# Patient Record
Sex: FEMALE | Race: WHITE | NOT HISPANIC OR LATINO | Employment: OTHER | URBAN - METROPOLITAN AREA
[De-identification: names, ages, dates, MRNs, and addresses within clinical notes are randomized per-mention and may not be internally consistent; named-entity substitution may affect disease eponyms.]

---

## 2021-05-17 ENCOUNTER — TELEPHONE (OUTPATIENT)
Dept: TRANSPLANT | Facility: CLINIC | Age: 61
End: 2021-05-17

## 2021-05-18 ENCOUNTER — TELEPHONE (OUTPATIENT)
Dept: TRANSPLANT | Facility: CLINIC | Age: 61
End: 2021-05-18

## 2021-06-22 ENCOUNTER — TELEPHONE (OUTPATIENT)
Dept: TRANSPLANT | Facility: CLINIC | Age: 61
End: 2021-06-22

## 2021-07-13 ENCOUNTER — TELEPHONE (OUTPATIENT)
Dept: TRANSPLANT | Facility: CLINIC | Age: 61
End: 2021-07-13

## 2021-07-15 DIAGNOSIS — K74.60 HEPATIC CIRRHOSIS, UNSPECIFIED HEPATIC CIRRHOSIS TYPE, UNSPECIFIED WHETHER ASCITES PRESENT: ICD-10-CM

## 2021-07-15 DIAGNOSIS — Z76.82 ORGAN TRANSPLANT CANDIDATE: ICD-10-CM

## 2021-07-29 DIAGNOSIS — Z76.82 ORGAN TRANSPLANT CANDIDATE: ICD-10-CM

## 2021-07-29 DIAGNOSIS — K74.60 HEPATIC CIRRHOSIS, UNSPECIFIED HEPATIC CIRRHOSIS TYPE, UNSPECIFIED WHETHER ASCITES PRESENT: Primary | ICD-10-CM

## 2021-07-29 RX ORDER — FUROSEMIDE 40 MG/1
40 TABLET ORAL DAILY
COMMUNITY
Start: 2021-03-08

## 2021-07-29 RX ORDER — DULAGLUTIDE 1.5 MG/.5ML
INJECTION, SOLUTION SUBCUTANEOUS
COMMUNITY
Start: 2021-06-03

## 2021-07-29 RX ORDER — NADOLOL 20 MG/1
TABLET ORAL
COMMUNITY

## 2021-07-29 RX ORDER — PRAVASTATIN SODIUM 40 MG/1
TABLET ORAL
COMMUNITY
Start: 2021-06-01

## 2021-07-29 RX ORDER — PREGABALIN 150 MG/1
150 CAPSULE ORAL 3 TIMES DAILY
COMMUNITY
Start: 2021-06-26

## 2021-07-29 RX ORDER — LOPERAMIDE HCL 2 MG
TABLET ORAL
COMMUNITY
Start: 2020-07-07

## 2021-07-29 RX ORDER — AMLODIPINE BESYLATE 5 MG/1
5 TABLET ORAL 2 TIMES DAILY
COMMUNITY
Start: 2021-06-16

## 2021-07-29 RX ORDER — VENLAFAXINE HYDROCHLORIDE 150 MG/1
150 CAPSULE, EXTENDED RELEASE ORAL EVERY MORNING
COMMUNITY
Start: 2021-06-26

## 2021-07-29 RX ORDER — FLUTICASONE PROPIONATE 50 MCG
2 SPRAY, SUSPENSION (ML) NASAL DAILY
COMMUNITY
Start: 2021-05-07

## 2021-07-29 RX ORDER — INSULIN GLARGINE 300 U/ML
INJECTION, SOLUTION SUBCUTANEOUS
COMMUNITY

## 2021-07-29 RX ORDER — LEVOTHYROXINE SODIUM 75 UG/1
75 TABLET ORAL EVERY MORNING
COMMUNITY
Start: 2021-03-07

## 2021-07-29 RX ORDER — POTASSIUM CHLORIDE 1500 MG/1
20 TABLET, EXTENDED RELEASE ORAL DAILY
COMMUNITY
Start: 2021-06-26

## 2021-07-29 RX ORDER — ONDANSETRON 4 MG/1
TABLET, ORALLY DISINTEGRATING ORAL
COMMUNITY

## 2021-07-29 RX ORDER — LISINOPRIL AND HYDROCHLOROTHIAZIDE 20; 25 MG/1; MG/1
1 TABLET ORAL EVERY MORNING
COMMUNITY
Start: 2021-06-18

## 2021-07-29 RX ORDER — METFORMIN HYDROCHLORIDE 1000 MG/1
1000 TABLET ORAL 2 TIMES DAILY
COMMUNITY
Start: 2021-03-08

## 2021-07-29 RX ORDER — PANTOPRAZOLE SODIUM 40 MG/1
TABLET, DELAYED RELEASE ORAL
COMMUNITY

## 2021-07-29 RX ORDER — FLUTICASONE PROPIONATE AND SALMETEROL 500; 50 UG/1; UG/1
POWDER RESPIRATORY (INHALATION)
COMMUNITY
Start: 2021-06-05

## 2021-08-06 ENCOUNTER — TELEPHONE (OUTPATIENT)
Dept: TRANSPLANT | Facility: CLINIC | Age: 61
End: 2021-08-06

## 2021-08-13 ENCOUNTER — HOSPITAL ENCOUNTER (OUTPATIENT)
Dept: RADIOLOGY | Facility: HOSPITAL | Age: 61
Discharge: HOME | End: 2021-08-13
Attending: STUDENT IN AN ORGANIZED HEALTH CARE EDUCATION/TRAINING PROGRAM
Payer: MEDICARE

## 2021-08-13 ENCOUNTER — OFFICE VISIT (OUTPATIENT)
Dept: TRANSPLANT | Facility: CLINIC | Age: 61
End: 2021-08-13
Payer: COMMERCIAL

## 2021-08-13 VITALS
BODY MASS INDEX: 35.38 KG/M2 | TEMPERATURE: 98 F | OXYGEN SATURATION: 94 % | HEIGHT: 64 IN | HEART RATE: 70 BPM | DIASTOLIC BLOOD PRESSURE: 64 MMHG | RESPIRATION RATE: 16 BRPM | SYSTOLIC BLOOD PRESSURE: 141 MMHG | WEIGHT: 207.25 LBS

## 2021-08-13 DIAGNOSIS — K74.60 HEPATIC CIRRHOSIS, UNSPECIFIED HEPATIC CIRRHOSIS TYPE, UNSPECIFIED WHETHER ASCITES PRESENT: ICD-10-CM

## 2021-08-13 DIAGNOSIS — Z76.82 ORGAN TRANSPLANT CANDIDATE: ICD-10-CM

## 2021-08-13 DIAGNOSIS — B19.20 COMPENSATED CIRRHOSIS RELATED TO HEPATITIS C VIRUS (HCV): Primary | ICD-10-CM

## 2021-08-13 DIAGNOSIS — K74.69 COMPENSATED CIRRHOSIS RELATED TO HEPATITIS C VIRUS (HCV): Primary | ICD-10-CM

## 2021-08-13 PROCEDURE — 3008F BODY MASS INDEX DOCD: CPT | Mod: CPTII,S$GLB,TXP, | Performed by: STUDENT IN AN ORGANIZED HEALTH CARE EDUCATION/TRAINING PROGRAM

## 2021-08-13 PROCEDURE — 81001 URINALYSIS AUTO W/SCOPE: CPT | Mod: 91,NTX | Performed by: STUDENT IN AN ORGANIZED HEALTH CARE EDUCATION/TRAINING PROGRAM

## 2021-08-13 PROCEDURE — 99999 PR PBB SHADOW E&M-EST. PATIENT-LVL V: ICD-10-PCS | Mod: PBBFAC,TXP,, | Performed by: STUDENT IN AN ORGANIZED HEALTH CARE EDUCATION/TRAINING PROGRAM

## 2021-08-13 PROCEDURE — 93975 VASCULAR STUDY: CPT | Mod: TC,TXP

## 2021-08-13 PROCEDURE — 80307 DRUG TEST PRSMV CHEM ANLYZR: CPT | Mod: NTX | Performed by: STUDENT IN AN ORGANIZED HEALTH CARE EDUCATION/TRAINING PROGRAM

## 2021-08-13 PROCEDURE — 3077F SYST BP >= 140 MM HG: CPT | Mod: CPTII,S$GLB,TXP, | Performed by: STUDENT IN AN ORGANIZED HEALTH CARE EDUCATION/TRAINING PROGRAM

## 2021-08-13 PROCEDURE — 3008F PR BODY MASS INDEX (BMI) DOCUMENTED: ICD-10-PCS | Mod: CPTII,S$GLB,TXP, | Performed by: STUDENT IN AN ORGANIZED HEALTH CARE EDUCATION/TRAINING PROGRAM

## 2021-08-13 PROCEDURE — 1160F PR REVIEW ALL MEDS BY PRESCRIBER/CLIN PHARMACIST DOCUMENTED: ICD-10-PCS | Mod: CPTII,S$GLB,TXP, | Performed by: STUDENT IN AN ORGANIZED HEALTH CARE EDUCATION/TRAINING PROGRAM

## 2021-08-13 PROCEDURE — 3078F PR MOST RECENT DIASTOLIC BLOOD PRESSURE < 80 MM HG: ICD-10-PCS | Mod: CPTII,S$GLB,TXP, | Performed by: STUDENT IN AN ORGANIZED HEALTH CARE EDUCATION/TRAINING PROGRAM

## 2021-08-13 PROCEDURE — 3078F DIAST BP <80 MM HG: CPT | Mod: CPTII,S$GLB,TXP, | Performed by: STUDENT IN AN ORGANIZED HEALTH CARE EDUCATION/TRAINING PROGRAM

## 2021-08-13 PROCEDURE — 99205 PR OFFICE/OUTPT VISIT, NEW, LEVL V, 60-74 MIN: ICD-10-PCS | Mod: S$GLB,TXP,, | Performed by: STUDENT IN AN ORGANIZED HEALTH CARE EDUCATION/TRAINING PROGRAM

## 2021-08-13 PROCEDURE — 1126F AMNT PAIN NOTED NONE PRSNT: CPT | Mod: CPTII,S$GLB,TXP, | Performed by: STUDENT IN AN ORGANIZED HEALTH CARE EDUCATION/TRAINING PROGRAM

## 2021-08-13 PROCEDURE — 1126F PR PAIN SEVERITY QUANTIFIED, NO PAIN PRESENT: ICD-10-PCS | Mod: CPTII,S$GLB,TXP, | Performed by: STUDENT IN AN ORGANIZED HEALTH CARE EDUCATION/TRAINING PROGRAM

## 2021-08-13 PROCEDURE — 93975 VASCULAR STUDY: CPT | Mod: 26,NTX,, | Performed by: RADIOLOGY

## 2021-08-13 PROCEDURE — 99999 PR PBB SHADOW E&M-EST. PATIENT-LVL V: CPT | Mod: PBBFAC,TXP,, | Performed by: STUDENT IN AN ORGANIZED HEALTH CARE EDUCATION/TRAINING PROGRAM

## 2021-08-13 PROCEDURE — 1159F PR MEDICATION LIST DOCUMENTED IN MEDICAL RECORD: ICD-10-PCS | Mod: CPTII,S$GLB,TXP, | Performed by: STUDENT IN AN ORGANIZED HEALTH CARE EDUCATION/TRAINING PROGRAM

## 2021-08-13 PROCEDURE — 1160F RVW MEDS BY RX/DR IN RCRD: CPT | Mod: CPTII,S$GLB,TXP, | Performed by: STUDENT IN AN ORGANIZED HEALTH CARE EDUCATION/TRAINING PROGRAM

## 2021-08-13 PROCEDURE — 93975 US ABDOMEN COMP WITH DOPPLER_PRE LIVER TRANSPLANT: ICD-10-PCS | Mod: 26,NTX,, | Performed by: RADIOLOGY

## 2021-08-13 PROCEDURE — 1159F MED LIST DOCD IN RCRD: CPT | Mod: CPTII,S$GLB,TXP, | Performed by: STUDENT IN AN ORGANIZED HEALTH CARE EDUCATION/TRAINING PROGRAM

## 2021-08-13 PROCEDURE — 76700 US ABDOMEN COMP WITH DOPPLER_PRE LIVER TRANSPLANT: ICD-10-PCS | Mod: 26,59,NTX, | Performed by: RADIOLOGY

## 2021-08-13 PROCEDURE — 76700 US EXAM ABDOM COMPLETE: CPT | Mod: 26,59,NTX, | Performed by: RADIOLOGY

## 2021-08-13 PROCEDURE — 3077F PR MOST RECENT SYSTOLIC BLOOD PRESSURE >= 140 MM HG: ICD-10-PCS | Mod: CPTII,S$GLB,TXP, | Performed by: STUDENT IN AN ORGANIZED HEALTH CARE EDUCATION/TRAINING PROGRAM

## 2021-08-13 PROCEDURE — 99205 OFFICE O/P NEW HI 60 MIN: CPT | Mod: S$GLB,TXP,, | Performed by: STUDENT IN AN ORGANIZED HEALTH CARE EDUCATION/TRAINING PROGRAM

## 2021-08-13 RX ORDER — .ALPHA.1-PROTEINASE INHIBITOR HUMAN 1 G/50ML
60 INJECTION, SOLUTION INTRAVENOUS
COMMUNITY

## 2021-08-16 ENCOUNTER — TELEPHONE (OUTPATIENT)
Dept: HEPATOLOGY | Facility: CLINIC | Age: 61
End: 2021-08-16

## 2021-08-16 LAB
AMPHET+METHAMPHET UR QL: NEGATIVE
BACTERIA #/AREA URNS AUTO: ABNORMAL /HPF
BARBITURATES UR QL SCN>200 NG/ML: NEGATIVE
BENZODIAZ UR QL SCN>200 NG/ML: NEGATIVE
BILIRUB UR QL STRIP: NEGATIVE
BZE UR QL SCN: NEGATIVE
CANNABINOIDS UR QL SCN: ABNORMAL
CLARITY UR REFRACT.AUTO: ABNORMAL
COLOR UR AUTO: ABNORMAL
CREAT UR-MCNC: 100 MG/DL (ref 15–325)
ETHANOL UR-MCNC: 18 MG/DL
GLUCOSE UR QL STRIP: NEGATIVE
HGB UR QL STRIP: NEGATIVE
HYALINE CASTS UR QL AUTO: 0 /LPF
KETONES UR QL STRIP: NEGATIVE
LEUKOCYTE ESTERASE UR QL STRIP: NEGATIVE
METHADONE UR QL SCN>300 NG/ML: NEGATIVE
MICROSCOPIC COMMENT: ABNORMAL
NITRITE UR QL STRIP: NEGATIVE
OPIATES UR QL SCN: NEGATIVE
PCP UR QL SCN>25 NG/ML: NEGATIVE
PH UR STRIP: 5 [PH] (ref 5–8)
PROT UR QL STRIP: ABNORMAL
RBC #/AREA URNS AUTO: 1 /HPF (ref 0–4)
SP GR UR STRIP: 1.02 (ref 1–1.03)
SQUAMOUS #/AREA URNS AUTO: 6 /HPF
TOXICOLOGY INFORMATION: ABNORMAL
URN SPEC COLLECT METH UR: ABNORMAL
WBC #/AREA URNS AUTO: 1 /HPF (ref 0–5)

## 2021-12-12 ENCOUNTER — PATIENT MESSAGE (OUTPATIENT)
Dept: HEPATOLOGY | Facility: CLINIC | Age: 61
End: 2021-12-12
Payer: MEDICARE

## 2021-12-12 ENCOUNTER — TELEPHONE (OUTPATIENT)
Dept: HEPATOLOGY | Facility: CLINIC | Age: 61
End: 2021-12-12
Payer: MEDICARE

## 2022-02-06 ENCOUNTER — TELEPHONE (OUTPATIENT)
Dept: HEPATOLOGY | Facility: CLINIC | Age: 62
End: 2022-02-06
Payer: MEDICARE

## 2022-02-06 NOTE — TELEPHONE ENCOUNTER
----- Message from Chino Glover MA sent at 8/16/2021  7:33 AM CDT -----  Regarding: return in 6 months with labs and US  return in 6 months with labs and US

## 2024-01-11 ENCOUNTER — TELEPHONE (OUTPATIENT)
Dept: HEPATOLOGY | Facility: CLINIC | Age: 64
End: 2024-01-11
Payer: MEDICARE

## 2024-01-11 NOTE — TELEPHONE ENCOUNTER
Reached out to pt in regards to scheduling appt. Pt vu and would like to know if labs and u/s are needed. Vu and will get message to provider    ----- Message from Colleen Miner sent at 1/11/2024  3:02 PM CST -----  Pt needs f/u appts dean'ed with Dr. Brown

## 2024-01-24 DIAGNOSIS — K75.81 LIVER CIRRHOSIS SECONDARY TO NASH: Primary | ICD-10-CM

## 2024-01-24 DIAGNOSIS — K74.60 LIVER CIRRHOSIS SECONDARY TO NASH: Primary | ICD-10-CM

## 2024-01-24 NOTE — TELEPHONE ENCOUNTER
Reached out to pt in regards to scheduling. Pt did not answer, left vm for pt.  Scheduled appts and will send appt letter for pt

## 2024-03-11 ENCOUNTER — OFFICE VISIT (OUTPATIENT)
Dept: HEPATOLOGY | Facility: CLINIC | Age: 64
End: 2024-03-11
Payer: MEDICARE

## 2024-03-11 ENCOUNTER — HOSPITAL ENCOUNTER (OUTPATIENT)
Dept: RADIOLOGY | Facility: HOSPITAL | Age: 64
Discharge: HOME | End: 2024-03-11
Attending: STUDENT IN AN ORGANIZED HEALTH CARE EDUCATION/TRAINING PROGRAM
Payer: MEDICARE

## 2024-03-11 VITALS
RESPIRATION RATE: 18 BRPM | HEART RATE: 64 BPM | OXYGEN SATURATION: 97 % | WEIGHT: 202.81 LBS | HEIGHT: 64 IN | TEMPERATURE: 97 F | DIASTOLIC BLOOD PRESSURE: 59 MMHG | BODY MASS INDEX: 34.62 KG/M2 | SYSTOLIC BLOOD PRESSURE: 108 MMHG

## 2024-03-11 DIAGNOSIS — B19.20 COMPENSATED CIRRHOSIS RELATED TO HEPATITIS C VIRUS (HCV): ICD-10-CM

## 2024-03-11 DIAGNOSIS — K75.81 LIVER CIRRHOSIS SECONDARY TO NASH: Primary | ICD-10-CM

## 2024-03-11 DIAGNOSIS — K74.60 LIVER CIRRHOSIS SECONDARY TO NASH: ICD-10-CM

## 2024-03-11 DIAGNOSIS — K74.69 COMPENSATED CIRRHOSIS RELATED TO HEPATITIS C VIRUS (HCV): ICD-10-CM

## 2024-03-11 DIAGNOSIS — K75.81 LIVER CIRRHOSIS SECONDARY TO NASH: ICD-10-CM

## 2024-03-11 DIAGNOSIS — K74.60 LIVER CIRRHOSIS SECONDARY TO NASH: Primary | ICD-10-CM

## 2024-03-11 DIAGNOSIS — E88.01 ALPHA-1-ANTITRYPSIN DEFICIENCY: ICD-10-CM

## 2024-03-11 PROCEDURE — 3078F DIAST BP <80 MM HG: CPT | Mod: CPTII,S$GLB,, | Performed by: STUDENT IN AN ORGANIZED HEALTH CARE EDUCATION/TRAINING PROGRAM

## 2024-03-11 PROCEDURE — 1159F MED LIST DOCD IN RCRD: CPT | Mod: CPTII,S$GLB,, | Performed by: STUDENT IN AN ORGANIZED HEALTH CARE EDUCATION/TRAINING PROGRAM

## 2024-03-11 PROCEDURE — 3074F SYST BP LT 130 MM HG: CPT | Mod: CPTII,S$GLB,, | Performed by: STUDENT IN AN ORGANIZED HEALTH CARE EDUCATION/TRAINING PROGRAM

## 2024-03-11 PROCEDURE — 3008F BODY MASS INDEX DOCD: CPT | Mod: CPTII,S$GLB,, | Performed by: STUDENT IN AN ORGANIZED HEALTH CARE EDUCATION/TRAINING PROGRAM

## 2024-03-11 PROCEDURE — 1160F RVW MEDS BY RX/DR IN RCRD: CPT | Mod: CPTII,S$GLB,, | Performed by: STUDENT IN AN ORGANIZED HEALTH CARE EDUCATION/TRAINING PROGRAM

## 2024-03-11 PROCEDURE — 99999 PR PBB SHADOW E&M-EST. PATIENT-LVL V: CPT | Mod: PBBFAC,,, | Performed by: STUDENT IN AN ORGANIZED HEALTH CARE EDUCATION/TRAINING PROGRAM

## 2024-03-11 PROCEDURE — 76705 ECHO EXAM OF ABDOMEN: CPT | Mod: 26,,, | Performed by: STUDENT IN AN ORGANIZED HEALTH CARE EDUCATION/TRAINING PROGRAM

## 2024-03-11 PROCEDURE — 76705 ECHO EXAM OF ABDOMEN: CPT | Mod: TC

## 2024-03-11 PROCEDURE — 99214 OFFICE O/P EST MOD 30 MIN: CPT | Mod: S$GLB,,, | Performed by: STUDENT IN AN ORGANIZED HEALTH CARE EDUCATION/TRAINING PROGRAM

## 2024-03-11 RX ORDER — PREDNISOLONE ACETATE 10 MG/ML
SUSPENSION/ DROPS OPHTHALMIC
COMMUNITY

## 2024-03-11 RX ORDER — CRISABOROLE 20 MG/G
OINTMENT TOPICAL
COMMUNITY

## 2024-03-11 RX ORDER — SODIUM CHLORIDE 9 MG/ML
INJECTION, SOLUTION INTRAVENOUS
COMMUNITY

## 2024-03-11 RX ORDER — CARISOPRODOL 350 MG/1
1 TABLET ORAL 2 TIMES DAILY PRN
COMMUNITY

## 2024-03-11 RX ORDER — ALLOPURINOL 100 MG/1
1 TABLET ORAL EVERY MORNING
COMMUNITY

## 2024-03-11 RX ORDER — ALBUTEROL SULFATE 90 UG/1
2 AEROSOL, METERED RESPIRATORY (INHALATION) EVERY 4 HOURS PRN
COMMUNITY

## 2024-03-11 RX ORDER — UBIDECARENONE 75 MG
500 CAPSULE ORAL
COMMUNITY

## 2024-03-11 RX ORDER — SEMAGLUTIDE 1.34 MG/ML
INJECTION, SOLUTION SUBCUTANEOUS
COMMUNITY
Start: 2024-03-06

## 2024-03-11 RX ORDER — CLOBETASOL PROPIONATE 0.5 MG/G
CREAM TOPICAL
COMMUNITY

## 2024-03-11 RX ORDER — CHOLECALCIFEROL (VITAMIN D3) 25 MCG
1000 TABLET ORAL
COMMUNITY

## 2024-03-11 RX ORDER — HYDROCORTISONE 25 MG/G
OINTMENT TOPICAL
COMMUNITY

## 2024-03-11 RX ORDER — FERROUS SULFATE 325(65) MG
325 TABLET ORAL
COMMUNITY

## 2024-03-11 RX ORDER — DAPAGLIFLOZIN 5 MG/1
1 TABLET, FILM COATED ORAL EVERY MORNING
COMMUNITY
Start: 2023-09-18

## 2024-03-11 NOTE — PROGRESS NOTES
Hepatology Follow Up Note    Referring provider: No ref. provider found  PCP: No, Primary Doctor    Chief complaint: follow up cirrhosis    HPI:  Vivienne Candelario is a 63 y.o. 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    She is without complaints today.  No recent hospitalizations or ED visits. She denies signs of decompensated cirrhosis including no recent abdominal distension, encephalopathy, jaundice, GI bleeding.    Background  She reports being diagnosed with hepatitis C approximately 5 years ago.  At that time she was also told that she had MA and cirrhosis.  She was treated with Epclusa with SVR.  Approximately 2 years ago she was diagnosed with alpha-1 antitrypsin deficiency.  She has history of nonbleeding esophageal varices.  She also has intermittent lower extremity edema but denies history of ascites.     She does not drink alcohol and has never been a heavy drinker. She has never received a blood transfusion and has no tattoos. She denies history of IV drug use. She has no known family history of liver disease. She denies signs of decompensated cirrhosis including no recent abdominal distension, encephalopathy, jaundice, GI bleeding.    Past Medical History:   Diagnosis Date    Alpha-1-antitrypsin deficiency     Anemia     Axillary lymphadenopathy     Cirrhosis     GERD (gastroesophageal reflux disease)     Heart murmur     aortic ejection murmur onset  11-     Multiple nodules of lung 10/10/2019    ALLEN (obstructive sleep apnea)     Type 2 diabetes mellitus        Past Surgical History:   Procedure Laterality Date    ACHILLES TENDON SURGERY      APPENDECTOMY      CHOLECYSTECTOMY         History reviewed. No pertinent family history.    Social History     Tobacco Use    Smoking status: Never    Smokeless tobacco: Never   Substance Use Topics    Alcohol use: Never    Drug use: Yes     Types: Marijuana     Comment: prescribed  marijuana/edibles for back pain       Current Outpatient Medications   Medication Sig Dispense Refill    0.9 % sodium chloride (SODIUM CHLORIDE 0.9%) solution       albuterol (PROVENTIL/VENTOLIN HFA) 90 mcg/actuation inhaler Inhale 2 puffs into the lungs every 4 (four) hours as needed.      allopurinoL (ZYLOPRIM) 100 MG tablet Take 1 tablet by mouth every morning.      alpha-1 proteinase inhib.,hum,, GLASSIA, (GLASSIA) 1 gram/50 mL (2 %) Soln infusion Inject 60 mg/kg into the vein every 7 days.      amLODIPine (NORVASC) 5 MG tablet Take 5 mg by mouth 2 (two) times daily.      blood sugar diagnostic (BLOOD GLUCOSE TEST) Strp UAD to test blood sugars three times daily      carisoprodoL (SOMA) 350 MG tablet Take 1 tablet by mouth 2 (two) times daily as needed.      clobetasoL (TEMOVATE) 0.05 % cream APPLY A THIN LAYER TO THE AFFECTED AREAS TOPICALLY 2 TIMES A DAY      crisaborole (EUCRISA) 2 % Oint Eucrisa 2 % topical ointment   APPLY A THIN LAYER TO THE AFFECTED AREA(S) BY TOPICAL ROUTE 2 TIMES PER DAY      cyanocobalamin 500 MCG tablet Take 500 mcg by mouth.      FARXIGA 5 mg Tab tablet Take 1 tablet by mouth every morning.      ferrous sulfate (FEOSOL) 325 mg (65 mg iron) Tab tablet Take 325 mg by mouth.      fluticasone propionate (FLONASE) 50 mcg/actuation nasal spray 2 sprays by Each Nostril route once daily.      fluticasone-salmeterol diskus inhaler 500-50 mcg SMARTSIG:By Mouth      furosemide (LASIX) 40 MG tablet Take 40 mg by mouth once daily.      hydrocortisone 2.5 % ointment APPLY A THIN LAYER TO THE AFFECTED AREA(S) BY TOPICAL ROUTE 2 TIMES PER DAY      insulin glargine, TOUJEO, (TOUJEO SOLOSTAR U-300 INSULIN) 300 unit/mL (1.5 mL) InPn pen Toujeo SoloStar U-300 Insulin 300 unit/mL (1.5 mL) subcutaneous pen   INJECT 40 UNITS SUBCUTANEOUSLY ONCE DAILY AT BEDTIME      levothyroxine (SYNTHROID) 75 MCG tablet Take 75 mcg by mouth every morning.      lisinopriL-hydrochlorothiazide (PRINZIDE,ZESTORETIC) 20-25 mg  Tab Take 1 tablet by mouth every morning.      loperamide (IMODIUM A-D) 2 mg Tab loperamide 2 mg tablet   Start: 4 mg PO x1, then 2 mg PO after each loose stool; Max: 16 mg/day      metFORMIN (GLUCOPHAGE) 1000 MG tablet Take 1,000 mg by mouth 2 (two) times daily.      nadoloL (CORGARD) 20 MG tablet nadolol 20 mg tablet   TAKE 1 TABLET BY MOUTH ONCE DAILY      ondansetron (ZOFRAN-ODT) 4 MG TbDL ondansetron 4 mg disintegrating tablet   Take 2 tablets every 12 hours by oral route as needed for 30 days.      OZEMPIC 1 mg/dose (4 mg/3 mL) Inject 1 mg every week by subcutaneous route.      pantoprazole (PROTONIX) 40 MG tablet pantoprazole 40 mg tablet,delayed release   Take 1 tablet by mouth twice daily      polyethylene glycol (COLYTE) 240-22.72-6.72 -5.84 gram SolR uad      potassium chloride (K-TAB) 20 mEq Take 20 mEq by mouth once daily.      pravastatin (PRAVACHOL) 40 MG tablet SMARTSI Tablet(s) By Mouth Every Evening      prednisoLONE acetate (PRED FORTE) 1 % DrpS INSTILL 1 DROP INTO LEFT EYE 5-6 TIMES DAILY FOR 2 DAYS THEN 4 TIMES DAILY FOR 4 DAYS , THEN 3 TIMES DAILY FOR 3 DAYS, THEN TWICE DAILY FOR 2 DAYS , THEN ONCE DAILY FOR 1 DAY THEN D/C      pregabalin (LYRICA) 150 MG capsule Take 150 mg by mouth 3 (three) times daily.      TRULICITY 1.5 mg/0.5 mL pen injector SMARTSI.5 Milliliter(s) SUB-Q Once a Week      venlafaxine (EFFEXOR-XR) 150 MG Cp24 Take 150 mg by mouth every morning.      vitamin D (VITAMIN D3) 1000 units Tab Take 1,000 Units by mouth.       No current facility-administered medications for this visit.       Review of patient's allergies indicates:   Allergen Reactions    Hydrocodone-acetaminophen Nausea And Vomiting       Review of Systems   Constitutional:  Negative for fever and weight loss.   Cardiovascular:  Negative for leg swelling.   Gastrointestinal:  Negative for abdominal pain, blood in stool, constipation, diarrhea, heartburn, melena, nausea and vomiting.       Vitals:    24  "1610   BP: (!) 108/59   Pulse: 64   Resp: 18   Temp: 97.4 °F (36.3 °C)   TempSrc: Oral   SpO2: 97%   Weight: 92 kg (202 lb 13.2 oz)   Height: 5' 4" (1.626 m)       Physical Exam  Constitutional:       General: She is not in acute distress.  Eyes:      General: No scleral icterus.  Cardiovascular:      Rate and Rhythm: Normal rate and regular rhythm.   Pulmonary:      Effort: Pulmonary effort is normal. No respiratory distress.   Abdominal:      General: Bowel sounds are normal. There is no distension.      Palpations: Abdomen is soft.      Tenderness: There is no abdominal tenderness. There is no guarding or rebound.   Musculoskeletal:      Right lower leg: No edema.      Left lower leg: No edema.   Skin:     Coloration: Skin is not jaundiced.         LABS: I personally reviewed pertinent laboratory findings.    Lab Results   Component Value Date    WBC 12.92 (H) 03/11/2024    HGB 10.6 (L) 03/11/2024    HCT 32.5 (L) 03/11/2024    MCV 85 03/11/2024     03/11/2024       Lab Results   Component Value Date     03/11/2024    K 3.7 03/11/2024     03/11/2024    CO2 25 03/11/2024    BUN 38 (H) 03/11/2024    CREATININE 1.7 (H) 03/11/2024    CALCIUM 9.5 03/11/2024    ANIONGAP 11 03/11/2024    ESTGFRAFRICA 56.4 (A) 08/13/2021    EGFRNONAA 48.9 (A) 08/13/2021       Lab Results   Component Value Date    ALT 19 03/11/2024    AST 23 03/11/2024    GGT 73 (H) 08/13/2021    ALKPHOS 92 03/11/2024    BILITOT 0.4 03/11/2024       Lab Results   Component Value Date    HEPBCAB Positive (A) 08/13/2021    HEPCAB Positive (A) 08/13/2021       MELD 3.0: 15 at 3/11/2024 12:48 PM  MELD-Na: 13 at 3/11/2024 12:48 PM  Calculated from:  Serum Creatinine: 1.7 mg/dL at 3/11/2024 12:48 PM  Serum Sodium: 138 mmol/L (Using max of 137 mmol/L) at 3/11/2024 12:48 PM  Total Bilirubin: 0.4 mg/dL (Using min of 1 mg/dL) at 3/11/2024 12:48 PM  Serum Albumin: 3.0 g/dL at 3/11/2024 12:48 PM  INR(ratio): 1.1 at 3/11/2024 12:48 PM  Age at listing " (hypothetical): 63 years  Sex: Female at 3/11/2024 12:48 PM       IMAGING: I personally reviewed imaging studies.      Assessment:  63 y.o. 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 She has no decompensating features.    1. Liver cirrhosis secondary to MA    2. Compensated cirrhosis related to hepatitis C virus (HCV)    3. Alpha-1-antitrypsin deficiency        Recommendations:  Cirrhosis due to hepatitis C, MASH, and A1AT deficiency: She has completed HCV treatment with SVR. Counseled on diet, weight loss, and control of co-morbidities. Encouraged to lose 10% of her body weight over the next 12 months.     Ascites/Edema: 2 gm Na diet. She takes Lasix for ANGEL.    Encephalopathy: Not an active issue    Transplant candidacy: Transplant evaluation not warranted given low MELD.    Cirrhosis HM  - HCC screening: US in 03/2024 without HCC. AFP 3.5. Repeat abdominal US and AFP every 6 months.    - Variceal screening:  She has history of nonbleeding varices.  She takes nadolol and does not warrant surveillance EGD as long as she remains on nadolol.    - Immunizations: Recommend HAV and HBV vaccinations if not immune.    Return to clinic in 6 months with labs and US.    I spent a total of 30 minutes on the day of the visit. This includes face to face time and non-face to face time preparing to see the patient (eg, review of tests), obtaining and/or reviewing separately obtained history, documenting clinical information in the electronic or other health record, independently interpreting results, and communicating results to the patient/family/caregiver, or care coordination.    This note includes dictation done using M*Modal speech recognition program. Word recognition mistakes are occasionally missed on review.    Leandro Brown MD  Staff Physician  Hepatology and Liver Transplant  [facility]  [facility]

## 2024-03-15 ENCOUNTER — TELEPHONE (OUTPATIENT)
Dept: HEPATOLOGY | Facility: CLINIC | Age: 64
End: 2024-03-15
Payer: MEDICARE

## 2024-03-15 NOTE — TELEPHONE ENCOUNTER
Recall placed  ----- Message from Leandro Brown MD sent at 3/15/2024  1:34 PM CDT -----  Return 6 months with labs and US

## 2024-03-26 ENCOUNTER — TELEPHONE (OUTPATIENT)
Dept: HEPATOLOGY | Facility: CLINIC | Age: 64
End: 2024-03-26
Payer: MEDICARE

## 2024-03-26 NOTE — TELEPHONE ENCOUNTER
Progress notes successfully faxed to provided fax number    ----- Message from Bing Barakat RN sent at 3/26/2024 10:43 AM CDT -----  Regarding: FW: status referral/appt check  Contact: [phone]  (Brenda)  Hi,   This is a hepatology patient.  Please handle below accordingly.  Thanks  ----- Message -----  From: Rand Martinez  Sent: 3/26/2024  10:39 AM CDT  To: [facility] Pre-Liver Transplant Clinical  Subject: status referral/appt check                       called requesting to find out patient status and if appts have been made  Please call at your convenience to advise     Dr. Elaine referral office in [place]  [phone]  (Brenda)    [phone]     No further information provided

## 2025-08-11 ENCOUNTER — TELEPHONE (OUTPATIENT)
Dept: TRANSPLANT | Facility: CLINIC | Age: 65
End: 2025-08-11
Payer: MEDICARE

## 2025-08-12 ENCOUNTER — TELEPHONE (OUTPATIENT)
Dept: TRANSPLANT | Facility: CLINIC | Age: 65
End: 2025-08-12
Payer: MEDICARE

## 2025-08-15 ENCOUNTER — TELEPHONE (OUTPATIENT)
Dept: HEPATOLOGY | Facility: CLINIC | Age: 65
End: 2025-08-15
Payer: MEDICARE

## 2025-08-20 ENCOUNTER — TELEPHONE (OUTPATIENT)
Dept: HEPATOLOGY | Facility: CLINIC | Age: 65
End: 2025-08-20
Payer: MEDICARE